# Patient Record
Sex: FEMALE | Race: WHITE | NOT HISPANIC OR LATINO | Employment: FULL TIME | URBAN - METROPOLITAN AREA
[De-identification: names, ages, dates, MRNs, and addresses within clinical notes are randomized per-mention and may not be internally consistent; named-entity substitution may affect disease eponyms.]

---

## 2020-07-17 LAB
EXTERNAL CHLAMYDIA RESULT: NEGATIVE
N GONORRHOEA RRNA SPEC QL PROBE: NEGATIVE

## 2020-07-20 LAB
EXTERNAL HIV SCREEN: NORMAL
HCV AB SER-ACNC: 0.2

## 2020-07-21 ENCOUNTER — TRANSCRIBE ORDERS (OUTPATIENT)
Dept: PERINATAL CARE | Facility: CLINIC | Age: 35
End: 2020-07-21

## 2020-07-21 DIAGNOSIS — O09.899 SUPERVISION OF OTHER HIGH RISK PREGNANCIES, UNSPECIFIED TRIMESTER: Primary | ICD-10-CM

## 2020-08-18 ENCOUNTER — TELEPHONE (OUTPATIENT)
Dept: PERINATAL CARE | Facility: CLINIC | Age: 35
End: 2020-08-18

## 2020-08-18 NOTE — TELEPHONE ENCOUNTER
Patient called and asked why her appointment was scheduled in the [facility] office on 8/20/20  She stated she cannot go to [facility] on 8/20 due to her work schedule  I offered her 8/24/20 at [facility] office where she requested to go  She could not go on 8/24/20 due to her working that day  I offered her 8/28/20 which is 13/6 the last day for the nuchal and she accepted that appointment at [facility]  She verbalized time date and understanding of the appointment

## 2020-08-18 NOTE — TELEPHONE ENCOUNTER
-------------------------------------------------------------    Attempted to reach patient by phone and left voicemail to confirm appointment for MFM ultrasound  1 support person ( must be over the age of 15) may accompany you for your appointment  If you or your support person have traveled outside the state in the past 2 weeks, please call and notify our office today #[phone]  You and your support person must wear a mask ,covering nose and mouth,during your entire visit  You and your support person will have temperature screened upon arrival     To minimize your exposure in our waiting room, please call our office prior to entering the building  Check in and rooming questions will be done via phone  We will give you directions when to enter for your appointment  Inside office # provided:  [facility] line:  [phone]  IF you are not feeling well- cough, fever, shortness of breath or any flu like symptoms, contact your primary care physician or [facility]    Any questions with these instructions please call Maternal Fetal Medicine nurse line today @ # [phone]

## 2020-08-21 ENCOUNTER — TELEPHONE (OUTPATIENT)
Dept: PERINATAL CARE | Facility: CLINIC | Age: 35
End: 2020-08-21

## 2020-08-21 NOTE — TELEPHONE ENCOUNTER
Spoke with patient and confirmed appointment with [facility]  1 support person ( must be over age of 15) may accompany patient  Will you and your support person be able to wear a mask ,without a valve , during entire appointment? yes   To minimize your exposure in our waiting area,check in and rooming questions will be done via phone  When you arrive in the parking lot please call the following inside line # prior to entering office:      [facility] line: [phone]    Have you or your support person traveled outside the state in the last 2 weeks?no   If yes, what state did you travel to? n/a     Do you or your support person have:  Fever or flu- like symptoms?no  Symptoms of upper respiratory infection like runny nose, sore throat or cough? no  Do you have new headache that you have not had in the past?no  Have you experienced any new shortness of breath recently?no  Do you have any new loss of taste or smell?no  Do you have any new diarrhea, nausea or vomiting?no  Have you recently been in contact with anyone who has been sick or diagnosed with COVID-19 infection?no  Have you been recommended to quarantine because of an exposure to a confirmed positive COVID19 person?no  You and your support person will have temperature screening upon arrival   Patient verbalized understanding of all instructions   -------------------------------------------------------------

## 2020-08-24 ENCOUNTER — ROUTINE PRENATAL (OUTPATIENT)
Dept: PERINATAL CARE | Facility: CLINIC | Age: 35
End: 2020-08-24
Payer: COMMERCIAL

## 2020-08-24 VITALS
HEART RATE: 72 BPM | DIASTOLIC BLOOD PRESSURE: 64 MMHG | WEIGHT: 151.2 LBS | SYSTOLIC BLOOD PRESSURE: 104 MMHG | HEIGHT: 63 IN | BODY MASS INDEX: 26.79 KG/M2 | TEMPERATURE: 97.4 F

## 2020-08-24 DIAGNOSIS — Z13.79 GENETIC SCREENING: ICD-10-CM

## 2020-08-24 DIAGNOSIS — Z36.82 ENCOUNTER FOR ANTENATAL SCREENING FOR NUCHAL TRANSLUCENCY: ICD-10-CM

## 2020-08-24 DIAGNOSIS — Z72.0 TOBACCO USER: ICD-10-CM

## 2020-08-24 DIAGNOSIS — Z3A.13 13 WEEKS GESTATION OF PREGNANCY: Primary | ICD-10-CM

## 2020-08-24 DIAGNOSIS — O09.899 SUPERVISION OF OTHER HIGH RISK PREGNANCIES, UNSPECIFIED TRIMESTER: ICD-10-CM

## 2020-08-24 PROCEDURE — 99202 OFFICE O/P NEW SF 15 MIN: CPT | Performed by: OBSTETRICS & GYNECOLOGY

## 2020-08-24 PROCEDURE — 76813 OB US NUCHAL MEAS 1 GEST: CPT | Performed by: OBSTETRICS & GYNECOLOGY

## 2020-08-24 NOTE — LETTER
2020     Trey Gerardo, [address]  [address]   [address] [address] [address]    Patient: Kyler Dalton   YOB: 1985   Date of Visit: 2020       Dear Dr Viki Granados: Thank you for referring Kyler Dalton to me for evaluation  Below are my notes for this consultation  If you have questions, please do not hesitate to call me  I look forward to following your patient along with you  Sincerely,        Chaitanya Croft MD        CC: No Recipients  Chaitanya Croft MD  2020  1:24 PM  Sign when Signing Visit  Ob ultrasound and brief MFM consultation    Ms Caridad Nissen is a 31 yo   patient at 15 2/7 weeks gestation  An ultrasound for viability, dating and nuchal translucency was completed today  See Ob Procedures in EPIC  1  Live, vazquez fetus with size = dates; DERRICK 2021  Normal nuchal translucency at the 57% for CRL  Today's ultrasound findings and suggested follow-up were discussed  with the patient  The Sequential Screen was discussed in detail, including the sensitivity for detection of Down syndrome  Definitive prenatal diagnosis is possible only through genetic amniocentesis or CVS   The patient had a fingerstick blood collection for hCG and JAMIE-A to complete the initial component of the Sequential Screen  Results should be available within one week  Ob Hx:     in  and  unexplained miscarriages at 9 and 7 weeks respectively; D/C x 1     2020: current pregnancy    Medical hx:Negative    Surgical hx: D/C x 1    Medications: PNV   Progesterone 200 mg    Allergies: none    Family Hx: non contributory    Social Hx: reports D/C tobacco use  cigarettes at the start of pregnancy; No current tobacco alcohol or illicit drug use  I reviewed the results of this ultrasound with Ms Caridad Nissen   and answered her questions      We discussed that tobacco use during pregnancy is associated with an increased risk for adverse pregnancy outcomes, including  cleft lip and palate, abnormal development of the fetal brain,  delivery, fetal growth restriction, abruptio placentae, and stillbirth and in the  period, SIDS, otitis and obesity  Use of nicotine replacement therapy has not been shown to be safe in pregnancy and it is not recommended  Encouraged her to continue D/C smoking as she is at risk for recurrence postpartum, especially if her partner continues to smoke  I encouraged him to D/C smoking as well       Recommendations:    1  Follow-up multiple marker serum screening at 16 to 20 weeks gestation is recommended to complete the Sequential Screen  2  Fetal Level II ultrasound imaging is scheduled at about 20 weeks gestation  In addition to review of the ultrasound results I completed a consultation in 20 minutes with > 50% in direct face to face contact and coordination of a plan of care  Thank you for referring your patient to our offices  The limitations of ultrasound to detect all anomalies was reviewed and how it is not  a test to rule out aneuploidy  If you have any further questions do not hesitate to contact us as [phone]      Mahamed Finch MD

## 2020-08-24 NOTE — PROGRESS NOTES
Ob ultrasound and brief MFM consultation    Ms Caridad Nissen is a 29 yo   patient at 15 2/7 weeks gestation  An ultrasound for viability, dating and nuchal translucency was completed today  See Ob Procedures in EPIC  1  Live, vazquez fetus with size = dates; DERRICK 2021  Normal nuchal translucency at the 57% for CRL  Today's ultrasound findings and suggested follow-up were discussed  with the patient  The Sequential Screen was discussed in detail, including the sensitivity for detection of Down syndrome  Definitive prenatal diagnosis is possible only through genetic amniocentesis or CVS   The patient had a fingerstick blood collection for hCG and JAMIE-A to complete the initial component of the Sequential Screen  Results should be available within one week  Ob Hx:     in  and  unexplained miscarriages at 9 and 7 weeks respectively; D/C x 1     2020: current pregnancy    Medical hx:Negative    Surgical hx: D/C x 1    Medications: PNV   Progesterone 200 mg    Allergies: none    Family Hx: non contributory    Social Hx: reports D/C tobacco use  cigarettes at the start of pregnancy; No current tobacco alcohol or illicit drug use  I reviewed the results of this ultrasound with Ms Caridad Nissen   and answered her questions  We discussed that tobacco use during pregnancy is associated with an increased risk for adverse pregnancy outcomes, including  cleft lip and palate, abnormal development of the fetal brain,  delivery, fetal growth restriction, abruptio placentae, and stillbirth and in the  period, SIDS, otitis and obesity  Use of nicotine replacement therapy has not been shown to be safe in pregnancy and it is not recommended  Encouraged her to continue D/C smoking as she is at risk for recurrence postpartum, especially if her partner continues to smoke  I encouraged him to D/C smoking as well       Recommendations:    1   Follow-up multiple marker serum screening at 16 to 20 weeks gestation is recommended to complete the Sequential Screen  2  Fetal Level II ultrasound imaging is scheduled at about 20 weeks gestation  In addition to review of the ultrasound results I completed a consultation in 20 minutes with > 50% in direct face to face contact and coordination of a plan of care  Thank you for referring your patient to our offices  The limitations of ultrasound to detect all anomalies was reviewed and how it is not  a test to rule out aneuploidy  If you have any further questions do not hesitate to contact us as [phone]      Kendra Ling MD

## 2020-08-28 ENCOUNTER — TELEPHONE (OUTPATIENT)
Dept: PERINATAL CARE | Facility: OTHER | Age: 35
End: 2020-08-28

## 2020-08-28 NOTE — TELEPHONE ENCOUNTER
----- Message from Kaitlyn Smart MD sent at 8/28/2020  1:44 PM EDT -----  I have reviewed the patient's lab results which are normal   Please contact the patient to inform her of the normal results        Kaitlyn Smart MD

## 2020-08-28 NOTE — TELEPHONE ENCOUNTER
Left message with normal sequential 1 result, instruction for part 2 and call back # at the # provided on communication consent  TRF mailed

## 2020-08-31 ENCOUNTER — TELEPHONE (OUTPATIENT)
Dept: PERINATAL CARE | Facility: CLINIC | Age: 35
End: 2020-08-31

## 2020-08-31 NOTE — TELEPHONE ENCOUNTER
Pt called Sunday 8/30/20 and left a voicemail message on the [facility] nurse line requesting a call back as she has a questions about her sequential screen part 2 blood work  Pt asked if she could go a [facility] location as it is more convenient for her  Advised pt that she can go to [facility], but to tell them that the sample has to go to their specialty lab, [facility]  Pt verbalizes understanding

## 2020-09-29 ENCOUNTER — TELEPHONE (OUTPATIENT)
Dept: PERINATAL CARE | Facility: OTHER | Age: 35
End: 2020-09-29

## 2020-09-29 NOTE — TELEPHONE ENCOUNTER
----- Message from Luiz Wild MD sent at 9/29/2020 11:11 AM EDT -----  I have reviewed the patient's lab results which are normal   Please contact the patient to inform her of the normal results        Luiz Wild MD

## 2020-09-29 NOTE — TELEPHONE ENCOUNTER
I called the patient and left a voicemail, per her communication consent, with the results of her part 2 sequential screen  I left the nurse line phone number ([phone]) for her to call with any questions

## 2020-10-15 ENCOUNTER — TELEPHONE (OUTPATIENT)
Dept: PERINATAL CARE | Facility: OTHER | Age: 35
End: 2020-10-15

## 2020-10-16 ENCOUNTER — ROUTINE PRENATAL (OUTPATIENT)
Dept: PERINATAL CARE | Facility: CLINIC | Age: 35
End: 2020-10-16
Payer: COMMERCIAL

## 2020-10-16 VITALS
HEART RATE: 80 BPM | HEIGHT: 63 IN | WEIGHT: 161.6 LBS | TEMPERATURE: 98.5 F | BODY MASS INDEX: 28.63 KG/M2 | DIASTOLIC BLOOD PRESSURE: 66 MMHG | SYSTOLIC BLOOD PRESSURE: 114 MMHG

## 2020-10-16 DIAGNOSIS — Z3A.20 20 WEEKS GESTATION OF PREGNANCY: ICD-10-CM

## 2020-10-16 DIAGNOSIS — Z36.86 ENCOUNTER FOR ANTENATAL SCREENING FOR CERVICAL LENGTH: ICD-10-CM

## 2020-10-16 DIAGNOSIS — O35.8XX0 ECHOGENIC FOCUS OF HEART OF FETUS AFFECTING ANTEPARTUM CARE OF MOTHER, SINGLE OR UNSPECIFIED FETUS: Primary | ICD-10-CM

## 2020-10-16 PROCEDURE — 99212 OFFICE O/P EST SF 10 MIN: CPT | Performed by: OBSTETRICS & GYNECOLOGY

## 2020-10-16 PROCEDURE — 76811 OB US DETAILED SNGL FETUS: CPT | Performed by: OBSTETRICS & GYNECOLOGY

## 2020-10-16 PROCEDURE — 76817 TRANSVAGINAL US OBSTETRIC: CPT | Performed by: OBSTETRICS & GYNECOLOGY

## 2021-01-14 ENCOUNTER — TELEPHONE (OUTPATIENT)
Dept: PERINATAL CARE | Facility: OTHER | Age: 36
End: 2021-01-14

## 2021-01-14 NOTE — TELEPHONE ENCOUNTER
-------------------------------------------------------------    Attempted to reach patient by phone and left voicemail to confirm appointment for MFM ultrasound  1 support person ( must be over the age of 15) may accompany you for your appointment  If you or your support person have traveled outside the state in the past 2 weeks, please call and notify our office today #[phone]  You and your support person must wear a mask ,covering nose and mouth,during your entire visit  To minimize your exposure in our waiting room, please call our office prior to entering the building  Check in and rooming questions will be done via phone  We will give you directions when to enter for your appointment  [place]: [phone]    IF you are not feeling well- cough, fever, shortness of breath or any flu like symptoms, contact your primary care physician or 1-[address] [place] [address]  If you are awaiting COVID 19 test results please call and reschedule your appointment    Any questions with these instructions please call Maternal Fetal Medicine nurse line today @ # [phone]

## 2021-01-15 ENCOUNTER — ULTRASOUND (OUTPATIENT)
Dept: PERINATAL CARE | Facility: CLINIC | Age: 36
End: 2021-01-15
Payer: COMMERCIAL

## 2021-01-15 VITALS
HEIGHT: 63 IN | DIASTOLIC BLOOD PRESSURE: 72 MMHG | SYSTOLIC BLOOD PRESSURE: 116 MMHG | WEIGHT: 176.8 LBS | HEART RATE: 78 BPM | BODY MASS INDEX: 31.33 KG/M2

## 2021-01-15 DIAGNOSIS — Z36.89 ENCOUNTER FOR ULTRASOUND TO CHECK FETAL GROWTH: Primary | ICD-10-CM

## 2021-01-15 DIAGNOSIS — IMO0002 EVALUATE ANATOMY NOT SEEN ON PRIOR SONOGRAM: ICD-10-CM

## 2021-01-15 PROBLEM — Z3A.13 13 WEEKS GESTATION OF PREGNANCY: Status: RESOLVED | Noted: 2020-08-24 | Resolved: 2021-01-15

## 2021-01-15 PROBLEM — Z36.82 ENCOUNTER FOR ANTENATAL SCREENING FOR NUCHAL TRANSLUCENCY: Status: RESOLVED | Noted: 2020-08-24 | Resolved: 2021-01-15

## 2021-01-15 PROBLEM — Z13.79 GENETIC SCREENING: Status: RESOLVED | Noted: 2020-08-24 | Resolved: 2021-01-15

## 2021-01-15 PROCEDURE — 99213 OFFICE O/P EST LOW 20 MIN: CPT | Performed by: OBSTETRICS & GYNECOLOGY

## 2021-01-15 PROCEDURE — 76816 OB US FOLLOW-UP PER FETUS: CPT | Performed by: OBSTETRICS & GYNECOLOGY

## 2021-01-15 NOTE — PROGRESS NOTES
Michel Miller: Ms Daysi Pickett was seen today at 33w6d for fetal growth and followup missed anatomy ultrasound  See ultrasound report under "OB Procedures" tab  Please don't hesitate to contact our office with any concerns or questions    Magen Bryan MD

## 2021-01-15 NOTE — PATIENT INSTRUCTIONS
Thank you for choosing us for your  care today  If you have any questions about your ultrasound or care, please do not hesitate to contact us or your primary obstetrician  Some general instructions for your pregnancy are:     Protect against coronavirus: Pregnant women are increased risk of severe COVID  Continue to practice social distancing, wear a mask, and wash your hands often  Because of the increased risk of pregnancy, you are advised to not attend or host inperson gatherings with people who do not currently live inside your household - this includes birthday parties, gender reveals, baby showers, etc)  Notify your primary care doctor if you have any symptoms including cough, shortness of breath or difficulty breathing, fever, chills, muscle pain, sore throat, or loss of taste or smell  Pregnant women can receive the coronavirus vaccine   Exercise: Aim for 22 minutes per day (150 minutes per week) of regular exercise  Walking is great!  Nutrition: aim for calcium-rich and iron-rich foods as well as healthy sources of protein   Protect against the flu: get yourself and your entire household vaccinated against influenza  This will protect your baby   Learn about Preeclampsia: preeclampsia is a common, serious high blood pressure complication in pregnancy  A blood pressure of 526PVJW (systolic or top number) or 07WCOF (diastolic or bottom number) is not normal and needs evaluation by your doctor   If you smoke, try to reduce how many cigarettes you smoke or quit completely  Do not vape   Other warning signs to watch out for in pregnancy or postpartum: chest pain, obstructed breathing or shortness of breath, seizures, thoughts of hurting yourself or your baby, bleeding, a painful or swollen leg, fever, or headache (see [facility] POST-BIRTH Warning Signs campaign)  If these happen call 911    Itching is also not normal in pregnancy and if you experience this, especially over your hands and feet, potentially worse at night, notify your doctors   Lastly, if you are contacted regarding participation in a survey about your experience in our office, please know that we take any feedback you provide seriously and use it to improve how we deliver care through our center

## 2021-03-12 ENCOUNTER — OFFICE VISIT (OUTPATIENT)
Dept: GASTROENTEROLOGY | Facility: CLINIC | Age: 36
End: 2021-03-12
Payer: COMMERCIAL

## 2021-03-12 VITALS
HEIGHT: 63 IN | SYSTOLIC BLOOD PRESSURE: 118 MMHG | HEART RATE: 74 BPM | DIASTOLIC BLOOD PRESSURE: 84 MMHG | BODY MASS INDEX: 29.77 KG/M2 | WEIGHT: 168 LBS

## 2021-03-12 DIAGNOSIS — R11.0 NAUSEA: Primary | ICD-10-CM

## 2021-03-12 DIAGNOSIS — R10.13 EPIGASTRIC PAIN: ICD-10-CM

## 2021-03-12 DIAGNOSIS — R11.2 NON-INTRACTABLE VOMITING WITH NAUSEA, UNSPECIFIED VOMITING TYPE: ICD-10-CM

## 2021-03-12 PROCEDURE — 99243 OFF/OP CNSLTJ NEW/EST LOW 30: CPT | Performed by: INTERNAL MEDICINE

## 2021-03-12 RX ORDER — FAMOTIDINE 20 MG/1
20 TABLET, FILM COATED ORAL DAILY
COMMUNITY

## 2021-03-12 RX ORDER — OMEPRAZOLE 20 MG/1
20 CAPSULE, DELAYED RELEASE ORAL DAILY
COMMUNITY
End: 2021-03-12 | Stop reason: ALTCHOICE

## 2021-03-12 RX ORDER — ONDANSETRON 4 MG/1
4 TABLET, FILM COATED ORAL EVERY 8 HOURS PRN
COMMUNITY

## 2021-03-12 NOTE — PROGRESS NOTES
[facility] - Outpatient Consultation  Laila Romero 37 y o  female MRN: [id number]  Encounter: [id number]    ASSESSMENT AND PLAN:      1  Nausea     2  Non-intractable vomiting with nausea, unspecified vomiting type     3  Epigastric pain    Upper abdominal pain nausea and vomiting 10 days postpartum may be related to back pain and spasm  Given the nausea and anorexia GERD and biliary colic or certainly possible  With the NSAID use ulcer and gastritis should be considered  She has cut out the NSAIDs and started famotidine and feels better  Would continue with this empiric therapy  Should symptoms return would do further testing to rule out biliary dyskinesia and cholecystitis  ·   Reflux diet and precautions  ·   Avoid any known trigger foods  ·   Continue famotidine daily  ·   Call if symptoms worsen especially if vomiting and fever develops  ·   Hold on HIDA scan or endoscopic evaluation for now unless symptoms worsen    Followup Appointment: One month or sooner if symptoms return  ______________________________________________________________________    Chief Complaint   Patient presents with    Upper abdominal pain and nausea  Has new baby (11days old)    Vomiting       HPI:   Laila Romero is a 37y o  year old female who presents with postpartum nausea and vomiting  Ten days post partum  Having upper back and upper abdominal pain  Feels nausea and anorexia  Vaginal delivery with tear  Otherwise no complications  Started having emesis with nausea  US with possible thickened wall, no stones  Was also taking Motrin  Pain better today  Able to eat today and yesterday  No more vomiting, no nausea today  Got Zofran if needed  Taking Pepcid  Stools usually regular, now every other day  No  Melena or heme        Historical Information   Past Medical History:   Diagnosis Date    Seasonal allergies      Past Surgical History:   Procedure Laterality Date    DILATION AND CURETTAGE, DIAGNOSTIC / THERAPEUTIC       Social History     Substance and Sexual Activity   Alcohol Use Not Currently     Social History     Substance and Sexual Activity   Drug Use Never     Social History     Tobacco Use   Smoking Status Light Tobacco Smoker   Smokeless Tobacco Never Used     Family History   Problem Relation Age of Onset    No Known Problems Mother     No Known Problems Father     No Known Problems Brother        Meds/Allergies     Current Outpatient Medications:     famotidine (PEPCID) 20 mg tablet    Prenatal Vit-Fe Fumarate-FA (PRENATAL VITAMINS PO)    ondansetron (ZOFRAN) 4 mg tablet    No Known Allergies    PHYSICAL EXAM:    Blood pressure 118/84, pulse 74, height 5' 3" (1 6 m), weight 76 2 kg (168 lb)  Body mass index is 29 76 kg/m²  General Appearance: NAD, cooperative, alert  Eyes: Anicteric, PERRLA, EOMI  ENT:  Normocephalic, atraumatic, normal mucosa  Neck:  Supple, symmetrical, trachea midline,   Resp:  Clear to auscultation bilaterally; no rales, rhonchi or wheezing; respirations unlabored   CV:  S1 S2, Regular rate and rhythm; no murmur, rub, or gallop  GI:  Soft, non-tender, non-distended; normal bowel sounds; no masses, no organomegaly   Rectal: Deferred  Musculoskeletal: No cyanosis, clubbing or edema  Normal ROM  Skin:  No jaundice, rashes, or lesions   Heme/Lymph: No palpable cervical lymphadenopathy  Psych: Normal affect, good eye contact  Neuro: No gross deficits, AAOx3    Lab Results:   No results found for: WBC, HGB, HCT, MCV, PLT  No results found for: NA, K, CL, CO2, ANIONGAP, BUN, CREATININE, GLUCOSE, GLUF, CALCIUM, CORRECTEDCA, AST, ALT, ALKPHOS, PROT, BILITOT, EGFR  No results found for: IRON, TIBC, FERRITIN  No results found for: LIPASE    Radiology Results:   Ultrasound:  Borderline wall thickening of a contracted gallbladder with trace pericholecystic fluid but no gallstones        REVIEW OF SYSTEMS:    CONSTITUTIONAL: Denies any fever, chills, rigors, and weight loss  HEENT: No earache or tinnitus  Denies hearing loss or visual disturbances  CARDIOVASCULAR: No chest pain or palpitations  RESPIRATORY: Denies any cough, hemoptysis, shortness of breath or dyspnea on exertion  GASTROINTESTINAL: As noted in the History of Present Illness  GENITOURINARY: No problems with urination  Denies any hematuria or dysuria  NEUROLOGIC: No dizziness or vertigo, denies headaches  MUSCULOSKELETAL: Denies any muscle or joint pain  SKIN: Denies skin rashes or itching  ENDOCRINE: Denies excessive thirst  Denies intolerance to heat or cold  PSYCHOSOCIAL: Denies depression or anxiety  Denies any recent memory loss

## 2021-03-12 NOTE — LETTER
March 12, 2021     Idris Guadalupe  [address]    Patient: Flores Stoddard   YOB: 1985   Date of Visit: 3/12/2021       Dear Dr Dylon Mathews: Thank you for referring Flores Stoddard to me for evaluation  Below are my notes for this consultation  If you have questions, please do not hesitate to call me  I look forward to following your patient along with you  Sincerely,        Yasmin Hoffman MD        CC: No Recipients  Yasmin Hoffman MD  3/12/2021  2:47 PM  Sign when Signing Visit    [facility] - Outpatient Consultation  Flores Stoddard 37 y o  female MRN: [id number]  Encounter: [id number]    ASSESSMENT AND PLAN:      1  Nausea     2  Non-intractable vomiting with nausea, unspecified vomiting type     3  Epigastric pain    Upper abdominal pain nausea and vomiting 10 days postpartum may be related to back pain and spasm  Given the nausea and anorexia GERD and biliary colic or certainly possible  With the NSAID use ulcer and gastritis should be considered  She has cut out the NSAIDs and started famotidine and feels better  Would continue with this empiric therapy  Should symptoms return would do further testing to rule out biliary dyskinesia and cholecystitis  ·   Reflux diet and precautions  ·   Avoid any known trigger foods  ·   Continue famotidine daily  ·   Call if symptoms worsen especially if vomiting and fever develops  ·   Hold on HIDA scan or endoscopic evaluation for now unless symptoms worsen    Followup Appointment: One month or sooner if symptoms return  ______________________________________________________________________    Chief Complaint   Patient presents with    Upper abdominal pain and nausea  Has new baby (11days old)    Vomiting       HPI:   Flores Stoddard is a 37y o  year old female who presents with postpartum nausea and vomiting  Ten days post partum    Having upper back and upper abdominal pain  Feels nausea and anorexia  Vaginal delivery with tear  Otherwise no complications  Started having emesis with nausea  US with possible thickened wall, no stones  Was also taking Motrin  Pain better today  Able to eat today and yesterday  No more vomiting, no nausea today  Got Zofran if needed  Taking Pepcid  Stools usually regular, now every other day  No  Melena or heme  Historical Information   Past Medical History:   Diagnosis Date    Seasonal allergies      Past Surgical History:   Procedure Laterality Date    DILATION AND CURETTAGE, DIAGNOSTIC / THERAPEUTIC       Social History     Substance and Sexual Activity   Alcohol Use Not Currently     Social History     Substance and Sexual Activity   Drug Use Never     Social History     Tobacco Use   Smoking Status Light Tobacco Smoker   Smokeless Tobacco Never Used     Family History   Problem Relation Age of Onset    No Known Problems Mother     No Known Problems Father     No Known Problems Brother        Meds/Allergies     Current Outpatient Medications:     famotidine (PEPCID) 20 mg tablet    Prenatal Vit-Fe Fumarate-FA (PRENATAL VITAMINS PO)    ondansetron (ZOFRAN) 4 mg tablet    No Known Allergies    PHYSICAL EXAM:    Blood pressure 118/84, pulse 74, height 5' 3" (1 6 m), weight 76 2 kg (168 lb)  Body mass index is 29 76 kg/m²  General Appearance: NAD, cooperative, alert  Eyes: Anicteric, PERRLA, EOMI  ENT:  Normocephalic, atraumatic, normal mucosa  Neck:  Supple, symmetrical, trachea midline,   Resp:  Clear to auscultation bilaterally; no rales, rhonchi or wheezing; respirations unlabored   CV:  S1 S2, Regular rate and rhythm; no murmur, rub, or gallop  GI:  Soft, non-tender, non-distended; normal bowel sounds; no masses, no organomegaly   Rectal: Deferred  Musculoskeletal: No cyanosis, clubbing or edema  Normal ROM    Skin:  No jaundice, rashes, or lesions   Heme/Lymph: No palpable cervical lymphadenopathy  Psych: Normal affect, good eye contact  Neuro: No gross deficits, AAOx3    Lab Results:   No results found for: WBC, HGB, HCT, MCV, PLT  No results found for: NA, K, CL, CO2, ANIONGAP, BUN, CREATININE, GLUCOSE, GLUF, CALCIUM, CORRECTEDCA, AST, ALT, ALKPHOS, PROT, BILITOT, EGFR  No results found for: IRON, TIBC, FERRITIN  No results found for: LIPASE    Radiology Results:   Ultrasound:  Borderline wall thickening of a contracted gallbladder with trace pericholecystic fluid but no gallstones  REVIEW OF SYSTEMS:    CONSTITUTIONAL: Denies any fever, chills, rigors, and weight loss  HEENT: No earache or tinnitus  Denies hearing loss or visual disturbances  CARDIOVASCULAR: No chest pain or palpitations  RESPIRATORY: Denies any cough, hemoptysis, shortness of breath or dyspnea on exertion  GASTROINTESTINAL: As noted in the History of Present Illness  GENITOURINARY: No problems with urination  Denies any hematuria or dysuria  NEUROLOGIC: No dizziness or vertigo, denies headaches  MUSCULOSKELETAL: Denies any muscle or joint pain  SKIN: Denies skin rashes or itching  ENDOCRINE: Denies excessive thirst  Denies intolerance to heat or cold  PSYCHOSOCIAL: Denies depression or anxiety  Denies any recent memory loss

## 2021-03-12 NOTE — PATIENT INSTRUCTIONS
Reflux diet and precautions  Avoid any trigger foods  Continue Pepcid for 2 weeks  Avoid ibuprofen or naproxen  Okay to take Tylenol  Call if nausea vomiting, upper abdominal pain or fever return

## 2023-04-21 LAB — HCV AB SER-ACNC: NON REACTIVE

## 2023-04-28 ENCOUNTER — TELEPHONE (OUTPATIENT)
Facility: HOSPITAL | Age: 38
End: 2023-04-28

## 2023-04-28 NOTE — TELEPHONE ENCOUNTER
Called patient to schedule MFM appointment, based on referral issued to Maternal Fetal Medicine by [facility] office  Left voicemail requesting patient to call back and schedule appointment, with office number for return call [phone]

## 2023-05-19 ENCOUNTER — ROUTINE PRENATAL (OUTPATIENT)
Dept: PERINATAL CARE | Facility: OTHER | Age: 38
End: 2023-05-19

## 2023-05-19 VITALS
DIASTOLIC BLOOD PRESSURE: 68 MMHG | SYSTOLIC BLOOD PRESSURE: 122 MMHG | BODY MASS INDEX: 27.57 KG/M2 | HEIGHT: 63 IN | WEIGHT: 155.6 LBS | HEART RATE: 74 BPM

## 2023-05-19 DIAGNOSIS — Z36.82 ENCOUNTER FOR NUCHAL TRANSLUCENCY TESTING: ICD-10-CM

## 2023-05-19 DIAGNOSIS — O09.521 MULTIGRAVIDA OF ADVANCED MATERNAL AGE IN FIRST TRIMESTER: Primary | ICD-10-CM

## 2023-05-19 DIAGNOSIS — O09.899 SUPERVISION OF OTHER HIGH RISK PREGNANCY, ANTEPARTUM: ICD-10-CM

## 2023-05-19 DIAGNOSIS — Z3A.12 12 WEEKS GESTATION OF PREGNANCY: ICD-10-CM

## 2023-05-19 PROBLEM — Z72.0 TOBACCO USER: Status: RESOLVED | Noted: 2020-08-24 | Resolved: 2023-05-19

## 2023-05-19 NOTE — LETTER
May 19, 2023     [address] [place] [id number]    Patient: Olga Francois   YOB: 1985   Date of Visit: 5/19/2023       Dear Dr Tucker Cross: Thank you for referring Olga Francois to me for evaluation  Below are my notes for this consultation  If you have questions, please do not hesitate to call me  I look forward to following your patient along with you  Sincerely,        Jorge Covarrubias MD        CC: No Recipients  Jorge Covarrubias MD  5/19/2023  1:42 PM  Sign when Signing Visit  Matt he today for a genetic screening ultrasound  This is her fourth pregnancy  She has a history of 2 early for semester miscarriages 1 requiring surgical intervention in 2019 and 2020  She had a successful full-term vaginal delivery 2235 without complications  She has no other significant medical or surgical history  Substance use history and family medical history otherwise unremarkable  A review of systems is otherwise negative  The patient had noninvasive prenatal testing through [employer] plus test   Her results were normal, placing her in a very low risk category  The sensitivity of detecting Trisomy 21 with this test is 99 1% with a specificity of 99 9%  The sensitivity of detecting Trisomy 18 is >99 9% with a specificity of 99 6%  The sensitivity of detecting Trisomy 13 is 91 7% with a specificity of 99 7%  Her negative result is very reassuring that the likelihood of her having a fetus with the aforementioned Trisomies is very low  We discussed today's findings in detail and answered all of her questions to apparent satisfaction  We discussed follow-up in detail I recommend she return at 20 weeks for detailed fetal anatomic evaluation

## 2023-05-19 NOTE — PROGRESS NOTES
Travis Kilgore resents today for a genetic screening ultrasound  This is her fourth pregnancy  She has a history of 2 early for semester miscarriages 1 requiring surgical intervention in 2019 and 2020  She had a successful full-term vaginal delivery 5092 without complications  She has no other significant medical or surgical history  Substance use history and family medical history otherwise unremarkable  A review of systems is otherwise negative  The patient had noninvasive prenatal testing through [employer] [employer] plus test   Her results were normal, placing her in a very low risk category  The sensitivity of detecting Trisomy 21 with this test is 99 1% with a specificity of 99 9%  The sensitivity of detecting Trisomy 18 is >99 9% with a specificity of 99 6%  The sensitivity of detecting Trisomy 13 is 91 7% with a specificity of 99 7%  Her negative result is very reassuring that the likelihood of her having a fetus with the aforementioned Trisomies is very low  We discussed today's findings in detail and answered all of her questions to apparent satisfaction  We discussed follow-up in detail I recommend she return at 20 weeks for detailed fetal anatomic evaluation

## 2023-07-14 ENCOUNTER — ROUTINE PRENATAL (OUTPATIENT)
Dept: PERINATAL CARE | Facility: OTHER | Age: 38
End: 2023-07-14
Payer: COMMERCIAL

## 2023-07-14 VITALS
DIASTOLIC BLOOD PRESSURE: 68 MMHG | HEIGHT: 63 IN | SYSTOLIC BLOOD PRESSURE: 102 MMHG | WEIGHT: 155 LBS | BODY MASS INDEX: 27.46 KG/M2 | HEART RATE: 71 BPM

## 2023-07-14 DIAGNOSIS — Z3A.20 20 WEEKS GESTATION OF PREGNANCY: ICD-10-CM

## 2023-07-14 DIAGNOSIS — Z36.86 ENCOUNTER FOR ANTENATAL SCREENING FOR CERVICAL LENGTH: ICD-10-CM

## 2023-07-14 DIAGNOSIS — O09.522 MULTIGRAVIDA OF ADVANCED MATERNAL AGE IN SECOND TRIMESTER: ICD-10-CM

## 2023-07-14 DIAGNOSIS — Z36.3 ENCOUNTER FOR ANTENATAL SCREENING FOR MALFORMATION: Primary | ICD-10-CM

## 2023-07-14 PROCEDURE — 76811 OB US DETAILED SNGL FETUS: CPT | Performed by: STUDENT IN AN ORGANIZED HEALTH CARE EDUCATION/TRAINING PROGRAM

## 2023-07-14 PROCEDURE — 76817 TRANSVAGINAL US OBSTETRIC: CPT | Performed by: STUDENT IN AN ORGANIZED HEALTH CARE EDUCATION/TRAINING PROGRAM

## 2023-07-14 NOTE — PROGRESS NOTES
Ultrasound Probe Disinfection    A transvaginal ultrasound was performed. Prior to use, disinfection was performed with High Level [employer] Process ([employer]). Probe serial number [id number]: [id number] was used.       Jorge Stafford  07/14/23  10:26 AM

## 2023-07-14 NOTE — LETTER
July 14, 2023     [address] [place] [address] [employer]. [address] [place] [address]    Patient: Khanh Gu   YOB: 1985   Date of Visit: 7/14/2023       Dear Dr. Dalton Stafford: Thank you for referring Khanh Gu to me for evaluation. Below are my notes for this consultation. If you have questions, please do not hesitate to call me. I look forward to following your patient along with you. Sincerely,        Juliet Daugherty MD        CC: No Recipients    Juliet Daugherty MD  7/14/2023 11:26 AM  Sign when Signing Visit  [facility]: Ms. John Lockett was seen today for anatomic survey and cervical length screening ultrasound. See ultrasound report under "OB Procedures" tab. The time spent on this established patient on the encounter date included 5 minutes previsit service time reviewing records and precharting, 5 minutes face-to-face service time counseling regarding results and coordinating care, and  5 minutes charting, totalling 15 minutes. Please don't hesitate to contact our office with any concerns or questions.   -Juliet Daugherty MD

## 2023-07-14 NOTE — PROGRESS NOTES
[facility]: Ms. Mickie Bedoya was seen today for anatomic survey and cervical length screening ultrasound. See ultrasound report under "OB Procedures" tab. The time spent on this established patient on the encounter date included 5 minutes previsit service time reviewing records and precharting, 5 minutes face-to-face service time counseling regarding results and coordinating care, and  5 minutes charting, totalling 15 minutes. Please don't hesitate to contact our office with any concerns or questions.   -Artur White MD

## 2023-07-26 ENCOUNTER — TELEPHONE (OUTPATIENT)
Dept: PERINATAL CARE | Facility: OTHER | Age: 38
End: 2023-07-26

## 2023-09-29 ENCOUNTER — ULTRASOUND (OUTPATIENT)
Dept: PERINATAL CARE | Facility: OTHER | Age: 38
End: 2023-09-29
Payer: COMMERCIAL

## 2023-09-29 VITALS
DIASTOLIC BLOOD PRESSURE: 62 MMHG | SYSTOLIC BLOOD PRESSURE: 108 MMHG | BODY MASS INDEX: 32.36 KG/M2 | HEART RATE: 83 BPM | WEIGHT: 182.6 LBS | HEIGHT: 63 IN

## 2023-09-29 DIAGNOSIS — O09.523 MULTIGRAVIDA OF ADVANCED MATERNAL AGE IN THIRD TRIMESTER: Primary | ICD-10-CM

## 2023-09-29 DIAGNOSIS — Z3A.31 31 WEEKS GESTATION OF PREGNANCY: ICD-10-CM

## 2023-09-29 PROCEDURE — 76816 OB US FOLLOW-UP PER FETUS: CPT | Performed by: OBSTETRICS & GYNECOLOGY

## 2023-09-29 PROCEDURE — 99213 OFFICE O/P EST LOW 20 MIN: CPT | Performed by: OBSTETRICS & GYNECOLOGY

## 2023-09-29 NOTE — PROGRESS NOTES
A fetal ultrasound was completed. See Ob procedures in Epic for an interpretation and recommendations. Do not hesitate to contact us in [facility] if you have questions. Laila Islas MD, [address]  Maternal Fetal Medicine

## 2023-09-29 NOTE — LETTER
September 29, 2023     [address] [place] [address], [address] [place] [address] [employer]. [address] [place] [address]    Patient: Reji Curtis   YOB: 1985   Date of Visit: 9/29/2023       Dear Dr. Saúl Fernandez: Thank you for referring Reji Curtis to me for evaluation. Below are my notes for this consultation. If you have questions, please do not hesitate to call me. I look forward to following your patient along with you. Sincerely,        George Bowman MD        CC: No Recipients    George Bowman MD  9/29/2023 10:35 AM  Sign when Signing Visit  A fetal ultrasound was completed. See Ob procedures in Epic for an interpretation and recommendations. Do not hesitate to contact us in [facility] if you have questions. Stephany Funk MD, [facility]  Maternal Fetal Medicine

## 2024-02-29 ENCOUNTER — OFFICE VISIT (OUTPATIENT)
Dept: GASTROENTEROLOGY | Facility: CLINIC | Age: 39
End: 2024-02-29
Payer: COMMERCIAL

## 2024-02-29 VITALS
SYSTOLIC BLOOD PRESSURE: 119 MMHG | BODY MASS INDEX: 28.56 KG/M2 | DIASTOLIC BLOOD PRESSURE: 85 MMHG | HEIGHT: 63 IN | WEIGHT: 161.2 LBS

## 2024-02-29 DIAGNOSIS — K64.8 INTERNAL HEMORRHOID, BLEEDING: Primary | ICD-10-CM

## 2024-02-29 PROCEDURE — 99203 OFFICE O/P NEW LOW 30 MIN: CPT | Performed by: INTERNAL MEDICINE

## 2024-02-29 RX ORDER — HYDROCORTISONE ACETATE 25 MG/1
25 SUPPOSITORY RECTAL 2 TIMES DAILY
Qty: 12 SUPPOSITORY | Refills: 0 | Status: SHIPPED | OUTPATIENT
Start: 2024-02-29 | End: 2024-03-01 | Stop reason: SDUPTHER

## 2024-02-29 RX ORDER — IBUPROFEN 800 MG
TABLET ORAL
COMMUNITY

## 2024-02-29 NOTE — PROGRESS NOTES
[facility] - Outpatient Consultation  Lenora Villareal 38 y.o. female MRN: [id number]  Encounter: [id number]    ASSESSMENT AND PLAN:      1. Internal hemorrhoid, bleeding  About 14 weeks postpartum.    Painless rectal bleeding about 2 weeks ago.  She showed me photos, most consistent with hemorrhoidal bleeding. symptoms resolved within 2 days.  No fissure or hemorrhoids for GYN    Exam today shows a medium grade 2 internal hemorrhoid in the right posterior position.  No rectal bleeding, no fissure or other perianal findings    I recommended a short course of hydrocortisone suppositories.  She will contact me immediately via the patient portal if symptoms return.  We will have a low threshold for colonoscopy with any recurrent bleeding    - hydrocortisone (ANUSOL-HC) 25 mg suppository; Insert 1 suppository (25 mg total) into the rectum 2 (two) times a day  Dispense: 12 suppository; Refill: 0      Followup Appointment: As needed  ______________________________________________________________________    Chief Complaint   Patient presents with    Rectal Bleeding       HPI:   Lenora Villareal is a 38 y.o. year old female who presents at the request of Dr. Diana for rectal bleeding.  She is about 14 weeks postpartum.  2 weeks ago on Friday she noted fresh bright red blood on the toilet tissue and in the toilet bowl.  Rectal exam for her obstetrician showed fresh bright red blood on the glove.  The next day she had a small amount of bright red blood per rectum.  By the third day symptoms had resolved.  Stool was brown.  There is no pain with defecation.  She denied any constipation or straining.  She never had bleeding like this in the past.  She denies any abdominal pain.    Historical Information   Past Medical History:   Diagnosis Date    Seasonal allergies      Past Surgical History:   Procedure Laterality Date    DILATION AND CURETTAGE, DIAGNOSTIC / THERAPEUTIC       Social  "History     Substance and Sexual Activity   Alcohol Use Yes    Alcohol/week: 2.0 standard drinks of alcohol    Types: 2 Glasses of wine per week     Social History     Substance and Sexual Activity   Drug Use Never     Social History     Tobacco Use   Smoking Status Light Smoker   Smokeless Tobacco Never     Family History   Problem Relation Age of Onset    No Known Problems Mother     No Known Problems Father     No Known Problems Brother     Colon cancer Neg Hx     Colon polyps Neg Hx        Meds/Allergies     Current Outpatient Medications:     Biotin 10 MG CHEW    hydrocortisone (ANUSOL-HC) 25 mg suppository    Cholecalciferol (Vitamin D3) 10 MCG (400 UNIT) CAPS    famotidine (PEPCID) 20 mg tablet    ondansetron (ZOFRAN) 4 mg tablet    Prenatal Vit-Fe Fumarate-FA (PRENATAL VITAMINS PO)    No Known Allergies    PHYSICAL EXAM:    Blood pressure 119/85, height 5' 3\" (1.6 m), weight 73.1 kg (161 lb 3.2 oz), unknown if currently breastfeeding. Body mass index is 28.56 kg/m².  General Appearance: NAD, cooperative, alert  Eyes: Anicteric, conjunctiva pink   ENT:  Normocephalic, atraumatic, normal mucosa.    Neck:  Supple, symmetrical, trachea midline,   Resp:  Clear to auscultation bilaterally; no rales, rhonchi or wheezing; respirations unlabored   CV:  S1 S2, Regular rate and rhythm; no murmur, rub, or gallop.  GI:  Soft, non-tender, non-distended; normal bowel sounds; no masses, no organomegaly   Rectal: Chaperoned by JARET Malcolm.  No perianal lesions.  Grade 2 hemorrhoid right posterior  Musculoskeletal: No cyanosis, clubbing or edema. Normal ROM.  Skin:  No jaundice, rashes, or lesions   Heme/Lymph: No palpable cervical lymphadenopathy  Psych: Normal affect, good eye contact  Neuro: No gross deficits, AAOx3    Lab Results:   No results found for: \"WBC\", \"HGB\", \"HCT\", \"MCV\", \"PLT\"  No results found for: \"NA\", \"K\", \"CL\", \"CO2\", \"ANIONGAP\", \"BUN\", \"CREATININE\", \"GLUCOSE\", \"GLUF\", \"CALCIUM\", \"CORRECTEDCA\", \"AST\", \"ALT\", " "\"ALKPHOS\", \"PROT\", \"BILITOT\", \"EGFR\"      Radiology Results:   No results found.      REVIEW OF SYSTEMS:    CONSTITUTIONAL: Denies any fever, chills, rigors, and weight loss.  HEENT: No earache or tinnitus. Denies hearing loss or visual disturbances.  CARDIOVASCULAR: No chest pain or palpitations.   RESPIRATORY: Denies any cough, hemoptysis, shortness of breath or dyspnea on exertion.  GASTROINTESTINAL: As noted in the History of Present Illness.   GENITOURINARY: No problems with urination. Denies any hematuria or dysuria.  NEUROLOGIC: No dizziness or vertigo, denies headaches.   MUSCULOSKELETAL: Denies any muscle or joint pain.   SKIN: Denies skin rashes or itching.   ENDOCRINE: Denies excessive thirst. Denies intolerance to heat or cold.  PSYCHOSOCIAL: Denies depression or anxiety. Denies any recent memory loss.   Answers submitted by the patient for this visit:  Abdominal Pain Questionnaire (Submitted on 2/27/2024)  Chief Complaint: Abdominal pain  Chronicity: new  Progression since onset: resolved  anorexia: No  arthralgias: No  belching: No  constipation: No  diarrhea: No  dysuria: No  fever: No  flatus: Yes  frequency: No  headaches: No  hematochezia: Yes  hematuria: No  melena: No  myalgias: No  nausea: No  weight loss: No  vomiting: No  Aggravated by: nothing    "

## 2024-02-29 NOTE — LETTER
February 29, 2024     Héctor Robbins MD  [address] [facility].  [address]  [place] [address]    Patient: Lenora Villareal   YOB: 1985   Date of Visit: 2/29/2024       Dear Dr. Robbins:    Thank you for referring Lenora Villareal to me for evaluation. Below are my notes for this consultation.    If you have questions, please do not hesitate to call me. I look forward to following your patient along with you.         Sincerely,        Zain Weiss DO        CC: Tonja Weiss DO  2/29/2024  5:14 PM  [facility] - Outpatient Consultation  Lenora Villareal 38 y.o. female MRN: [id number]  Encounter: [id number]    ASSESSMENT AND PLAN:      1. Internal hemorrhoid, bleeding  About 14 weeks postpartum.    Painless rectal bleeding about 2 weeks ago.  She showed me photos, most consistent with hemorrhoidal bleeding. symptoms resolved within 2 days.  No fissure or hemorrhoids for GYN    Exam today shows a medium grade 2 internal hemorrhoid in the right posterior position.  No rectal bleeding, no fissure or other perianal findings    I recommended a short course of hydrocortisone suppositories.  She will contact me immediately via the patient portal if symptoms return.  We will have a low threshold for colonoscopy with any recurrent bleeding    - hydrocortisone (ANUSOL-HC) 25 mg suppository; Insert 1 suppository (25 mg total) into the rectum 2 (two) times a day  Dispense: 12 suppository; Refill: 0      Followup Appointment: As needed  ______________________________________________________________________    Chief Complaint   Patient presents with   • Rectal Bleeding       HPI:   Lenora Villareal is a 38 y.o. year old female who presents at the request of Dr. Diana for rectal bleeding.  She is about 14 weeks postpartum.  2 weeks ago on Friday she noted fresh bright red blood on the toilet tissue and in the toilet bowl.  Rectal exam for  "her obstetrician showed fresh bright red blood on the glove.  The next day she had a small amount of bright red blood per rectum.  By the third day symptoms had resolved.  Stool was brown.  There is no pain with defecation.  She denied any constipation or straining.  She never had bleeding like this in the past.  She denies any abdominal pain.    Historical Information  Past Medical History:   Diagnosis Date   • Seasonal allergies      Past Surgical History:   Procedure Laterality Date   • DILATION AND CURETTAGE, DIAGNOSTIC / THERAPEUTIC       Social History     Substance and Sexual Activity   Alcohol Use Yes   • Alcohol/week: 2.0 standard drinks of alcohol   • Types: 2 Glasses of wine per week     Social History     Substance and Sexual Activity   Drug Use Never     Social History     Tobacco Use   Smoking Status Light Smoker   Smokeless Tobacco Never     Family History   Problem Relation Age of Onset   • No Known Problems Mother    • No Known Problems Father    • No Known Problems Brother    • Colon cancer Neg Hx    • Colon polyps Neg Hx        Meds/Allergies    Current Outpatient Medications:   •  Biotin 10 MG CHEW  •  hydrocortisone (ANUSOL-HC) 25 mg suppository  •  Cholecalciferol (Vitamin D3) 10 MCG (400 UNIT) CAPS  •  famotidine (PEPCID) 20 mg tablet  •  ondansetron (ZOFRAN) 4 mg tablet  •  Prenatal Vit-Fe Fumarate-FA (PRENATAL VITAMINS PO)    No Known Allergies    PHYSICAL EXAM:    Blood pressure 119/85, height 5' 3\" (1.6 m), weight 73.1 kg (161 lb 3.2 oz), unknown if currently breastfeeding. Body mass index is 28.56 kg/m².  General Appearance: NAD, cooperative, alert  Eyes: Anicteric, conjunctiva pink   ENT:  Normocephalic, atraumatic, normal mucosa.    Neck:  Supple, symmetrical, trachea midline,   Resp:  Clear to auscultation bilaterally; no rales, rhonchi or wheezing; respirations unlabored   CV:  S1 S2, Regular rate and rhythm; no murmur, rub, or gallop.  GI:  Soft, non-tender, non-distended; normal " "bowel sounds; no masses, no organomegaly   Rectal: Chaperoned by JARET Malcolm.  No perianal lesions.  Grade 2 hemorrhoid right posterior  Musculoskeletal: No cyanosis, clubbing or edema. Normal ROM.  Skin:  No jaundice, rashes, or lesions   Heme/Lymph: No palpable cervical lymphadenopathy  Psych: Normal affect, good eye contact  Neuro: No gross deficits, AAOx3    Lab Results:   No results found for: \"WBC\", \"HGB\", \"HCT\", \"MCV\", \"PLT\"  No results found for: \"NA\", \"K\", \"CL\", \"CO2\", \"ANIONGAP\", \"BUN\", \"CREATININE\", \"GLUCOSE\", \"GLUF\", \"CALCIUM\", \"CORRECTEDCA\", \"AST\", \"ALT\", \"ALKPHOS\", \"PROT\", \"BILITOT\", \"EGFR\"      Radiology Results:   No results found.      REVIEW OF SYSTEMS:    CONSTITUTIONAL: Denies any fever, chills, rigors, and weight loss.  HEENT: No earache or tinnitus. Denies hearing loss or visual disturbances.  CARDIOVASCULAR: No chest pain or palpitations.   RESPIRATORY: Denies any cough, hemoptysis, shortness of breath or dyspnea on exertion.  GASTROINTESTINAL: As noted in the History of Present Illness.   GENITOURINARY: No problems with urination. Denies any hematuria or dysuria.  NEUROLOGIC: No dizziness or vertigo, denies headaches.   MUSCULOSKELETAL: Denies any muscle or joint pain.   SKIN: Denies skin rashes or itching.   ENDOCRINE: Denies excessive thirst. Denies intolerance to heat or cold.  PSYCHOSOCIAL: Denies depression or anxiety. Denies any recent memory loss.   Answers submitted by the patient for this visit:  Abdominal Pain Questionnaire (Submitted on 2/27/2024)  Chief Complaint: Abdominal pain  Chronicity: new  Progression since onset: resolved  anorexia: No  arthralgias: No  belching: No  constipation: No  diarrhea: No  dysuria: No  fever: No  flatus: Yes  frequency: No  headaches: No  hematochezia: Yes  hematuria: No  melena: No  myalgias: No  nausea: No  weight loss: No  vomiting: No  Aggravated by: nothing    "

## 2024-02-29 NOTE — PATIENT INSTRUCTIONS
You had a small to medium grade 2 internal hemorrhoid in the right posterior position.  We will treat with hydrocortisone suppositories, prescription was sent to your pharmacy.  If the co-pay is high, you can substitute with Preparation H phenylephrine suppositories.    If symptoms return please contact me via the patient portal and we can discuss treatment options based upon severity of symptoms.

## 2024-03-01 ENCOUNTER — NURSE TRIAGE (OUTPATIENT)
Age: 39
End: 2024-03-01

## 2024-03-01 DIAGNOSIS — K64.8 INTERNAL HEMORRHOID, BLEEDING: ICD-10-CM

## 2024-03-01 RX ORDER — HYDROCORTISONE ACETATE 25 MG/1
25 SUPPOSITORY RECTAL 2 TIMES DAILY
Qty: 12 SUPPOSITORY | Refills: 0 | Status: SHIPPED | OUTPATIENT
Start: 2024-03-01

## 2024-03-01 NOTE — TELEPHONE ENCOUNTER
"Last ov 2/29/24 Dr. Weiss    Please sign off on order to alternate pharmacy per patient request. I placed as phone in and called order directly into [facility].    Answer Assessment - Initial Assessment Questions  1. REASON FOR CALL or QUESTION: \"What is your reason for calling today?\" or \"How can I best help you?\" or \"What question do you have that I can help answer?\"      Patient requested med order to alternate pharmacy due to insurance change.    Protocols used: Information Only Call - No Triage-ADULT-[place]    "

## 2024-03-01 NOTE — TELEPHONE ENCOUNTER
Regarding: medication and pharmacy  ----- Message from Brianna Salas sent at 3/1/2024  9:37 AM EST -----  Pt called stating she needs hydrocortisone 25mg suppositories sent to [facility] in [place]. Her insurance changed so she now has to use [employer] not [facility]. Phone number for David [phone].